# Patient Record
Sex: FEMALE | Race: BLACK OR AFRICAN AMERICAN | Employment: UNEMPLOYED | URBAN - METROPOLITAN AREA
[De-identification: names, ages, dates, MRNs, and addresses within clinical notes are randomized per-mention and may not be internally consistent; named-entity substitution may affect disease eponyms.]

---

## 2017-01-03 ENCOUNTER — OP HISTORICAL/CONVERTED ENCOUNTER (OUTPATIENT)
Dept: OTHER | Age: 47
End: 2017-01-03

## 2017-11-25 ENCOUNTER — ED HISTORICAL/CONVERTED ENCOUNTER (OUTPATIENT)
Dept: OTHER | Age: 47
End: 2017-11-25

## 2018-07-17 ENCOUNTER — WORRISOME GROWTH - SEE NOTE (OUTPATIENT)
Dept: URBAN - METROPOLITAN AREA CLINIC 34 | Facility: CLINIC | Age: 48
Setting detail: DERMATOLOGY
End: 2018-07-17

## 2018-07-17 PROCEDURE — 17110 DESTRUCT B9 LESION 1-14: CPT

## 2018-07-17 PROCEDURE — OTHER COSMETIC TREATMENT: OTHER

## 2019-08-25 ENCOUNTER — ED HISTORICAL/CONVERTED ENCOUNTER (OUTPATIENT)
Dept: OTHER | Age: 49
End: 2019-08-25

## 2020-03-06 ENCOUNTER — OP HISTORICAL/CONVERTED ENCOUNTER (OUTPATIENT)
Dept: OTHER | Age: 50
End: 2020-03-06

## 2020-03-14 PROBLEM — 40739000: Status: ACTIVE | Noted: 2020-02-04

## 2020-03-14 PROBLEM — 235595009: Status: ACTIVE | Noted: 2020-02-04

## 2020-05-11 PROBLEM — 197125005: Status: ACTIVE | Noted: 2020-03-11

## 2020-05-11 PROBLEM — 40890009: Status: ACTIVE | Noted: 2020-05-11

## 2020-05-11 PROBLEM — 266435005: Status: ACTIVE | Noted: 2020-05-11

## 2020-06-09 ENCOUNTER — OFFICE VISIT (OUTPATIENT)
Dept: URBAN - METROPOLITAN AREA CLINIC 35 | Facility: CLINIC | Age: 50
End: 2020-06-09

## 2020-06-09 ENCOUNTER — TELEPHONE ENCOUNTER (OUTPATIENT)
Dept: URBAN - METROPOLITAN AREA CLINIC 35 | Facility: CLINIC | Age: 50
End: 2020-06-09

## 2020-06-09 ENCOUNTER — DASHBOARD ENCOUNTERS (OUTPATIENT)
Age: 50
End: 2020-06-09

## 2020-06-09 VITALS — HEIGHT: 64 IN | WEIGHT: 220 LBS | BODY MASS INDEX: 37.56 KG/M2

## 2020-06-09 RX ORDER — AMITRIPTYLINE HYDROCHLORIDE 25 MG/1
1 TABLET AT BEDTIME TABLET, FILM COATED ORAL ONCE A DAY
Qty: 30 | Refills: 1 | Status: ACTIVE | COMMUNITY
Start: 2020-05-11

## 2020-06-09 RX ORDER — LORATADINE 10 MG/1
1 TABLET TABLET ORAL ONCE A DAY
Qty: 30 | Status: ACTIVE | COMMUNITY

## 2020-06-09 RX ORDER — POLYETHYLENE GLYCOL 3350, SODIUM SULFATE, SODIUM CHLORIDE, POTASSIUM CHLORIDE, ASCORBIC ACID, SODIUM ASCORBATE 140-9-5.2G
140GM KIT ORAL BID
Qty: 1 | Refills: 0 | Status: ON HOLD | COMMUNITY
Start: 2020-02-04

## 2020-06-09 RX ORDER — UBIDECARENONE 30 MG
AS DIRECTED CAPSULE ORAL
Status: ACTIVE | COMMUNITY

## 2020-06-09 RX ORDER — RIFAXIMIN 550 MG/1
1 TABLET TABLET ORAL TID
Qty: 42 TABLET | Refills: 1 | Status: ON HOLD | COMMUNITY
Start: 2020-03-11

## 2020-06-09 RX ORDER — TELMISARTAN 80 MG/1
TAKE 1 TABLET BY MOUTH EVERY DAY TABLET ORAL
Qty: 90 UNSPECIFIED | Refills: 1 | Status: ACTIVE | COMMUNITY

## 2020-06-09 RX ORDER — OMEPRAZOLE 40 MG/1
AS DIRECTED CAPSULE, DELAYED RELEASE PELLETS ORAL
Qty: 90 | Refills: 1 | Status: ON HOLD | COMMUNITY
Start: 2020-02-04

## 2020-06-09 RX ORDER — DICYCLOMINE HYDROCHLORIDE 10 MG/1
1 CAPSULE CAPSULE ORAL THREE TIMES A DAY
Qty: 90 CAPSULE | Refills: 1 | OUTPATIENT
Start: 2020-06-09

## 2020-06-09 RX ORDER — OMEPRAZOLE 40 MG/1
1 CAPSULE 30 MINUTES BEFORE MORNING MEAL CAPSULE, DELAYED RELEASE ORAL ONCE A DAY
Qty: 90 UNSPECIFIED | Refills: 3 | Status: ACTIVE | COMMUNITY

## 2020-06-09 NOTE — HPI-MIGRATED HPI
;   ;   ;     Dysphagia : Patient denies dysphagia at this times. She continues to take Omeprazole 40 mg once a day.   Last visit (5/11/2020) Patient denies dysphagia at this times. She continues to take Omeprazole 40 mg once a day.   Last visit (3/11/2020) Since the procedure patient denies dysphagia, globus, changes in appetite, and changes in bowel habits. She has increased to Omeprazole 40 mg.  EGD with dilatation (51 Fr savary dilator): 2 cm HH, esophageal ring at GEJ, large amount of bile in stomach, small benign looking polyp in antrum of stomach that could not be dilated as procedure had to be performed quickly as she was intermittently coughing during procedure (refer to full EGD report for details).  Last visit 2/4/2020 Patient admits onset of dysphagia a few months ago. She admits discomfort with swallowing liquids, and solids.   She is currently taking Omeprazole 20 mg with some relief. She has been on the this for years.   Symptoms include burning in the esophagus.;   Abdominal Pain : Patient denies adominal pain at this time   Last visit (5/11/2020) Patient denies abdominal pain at this time.  Last visit (3/11/2020) Patient presents today for a follow up of her EGD and colonoscopy. Patient denies any complications after her procedure.    Patient admits 1-3 BM's a day with stools loose. No BM's in middle of night. Patient denies any rectal bleeding, melena, mucus, pruritus ani, or rectal pain at this time.  Still has pain in LLQ although better.  Colonoscopy and path report as documented below.  Celiac panel negative  Stool Pancreatic Elastase NL   Last visit (2/4/2020) 49 year old female presents today for consultation of abdominal pain. Patient admits abdominal pain that is located in LLQ and is described as a dull ache that varies in intensity.   Pain started several years ago after she had Hysterectomy  but a change has occurred within the past few months; pain is occurring daily and happens when she has to have a BM.   Patient admits 2-3 BM' s per day with thin loose stool. Patient denies melena, blood, or mucus in stool.   Baseline: BM's approximately 30 minutes after meals since GB surgery 20 yrs ago. Patient admits the use of antibiotics within the last 2 months for treatment of a cold; she took it for 10 days.  She denies a family history of colon cancer or diseases/esophageal or gastric cancer or diseases. She also denies past Colonoscopy/EGD.  Stool studies 12/27/2019 were all not detected. Studies included E Coli, Campylobacter, Salmonella and shigella, C-Diff, Crypto, Giardia, Ova and Parasites, and fecal leukocyte.  CT ABD/Pelvis 12/27/2019: No free fluid within the abdomen, No intra-abdominal or retroperitoneal adenopathy. Small hiatal hernia. Colonic diverticulosis without evidence of diverticulitis. Normal caliber large and small bowel. No evidence of bowel obstruction. Normal appendix. Scattered colonic diverticulosis without evidence of diverticulitis. No acute findings within the abdomen or pelvis.   No fever. No N/V. She has heartburn and takes Omeprazole for it and works pretty well. Occasionally will take Tums. She describes swallowing issues. States feels whatever she is drinking or eats will get stuck in upper esophagus. Food and liquids will go down; just feel like if gets stuck. ;   IBS : Patient presents today for follow up of IBS via tele health visit and consent has been obtained.   Patient was started on Amitriptyline HCl Tablet, 25. She states only had mild improvement of IBS symptoms. She admits still 3-4 BMs per day, with soft stool. She denies melena, blood, or mucus in stool.   No abdominal crampy pain associated to BM's. There is just minimal bloating. She has not been able to identified any food that will make it worse.  Patient has had a colonoscopy with random colon bx's. EGD with small bowel bx's, all negative for possible etiology of loose frequent stools. Stool studies 12/27/19 were negative for bacteria, giardia, crypto, O+P, C Diff and leukocytes. Pancreatic elastase NL Patient does not think lactose products bother her.  Frequent BM's precedes the use of PPI's.  Last visit (5/11/2020) Patient presents today for follow up of IBS via tele visit and consent has been obtained.  Patient admits 4-5 BMs per day with loose stool. She denies melena, blood, or mucus in stool.   Patient completed Xifaxan 550 mg, she denies improvement.;

## 2020-06-17 ENCOUNTER — ED HISTORICAL/CONVERTED ENCOUNTER (OUTPATIENT)
Dept: OTHER | Age: 50
End: 2020-06-17

## 2020-07-08 ENCOUNTER — ED HISTORICAL/CONVERTED ENCOUNTER (OUTPATIENT)
Dept: OTHER | Age: 50
End: 2020-07-08

## 2020-08-17 ENCOUNTER — LAB OUTSIDE AN ENCOUNTER (OUTPATIENT)
Dept: URBAN - METROPOLITAN AREA CLINIC 35 | Facility: CLINIC | Age: 50
End: 2020-08-17

## 2020-08-20 LAB — OVA AND PARASITE - QDX: (no result)

## 2020-09-04 ENCOUNTER — TELEPHONE ENCOUNTER (OUTPATIENT)
Dept: URBAN - METROPOLITAN AREA CLINIC 35 | Facility: CLINIC | Age: 50
End: 2020-09-04

## 2021-04-12 ENCOUNTER — TRANSCRIBE ORDER (OUTPATIENT)
Dept: SCHEDULING | Age: 51
End: 2021-04-12

## 2021-04-12 DIAGNOSIS — K57.00 PERFORATED DIVERTICULUM OF DUODENUM: Primary | ICD-10-CM

## 2021-11-15 ENCOUNTER — HOSPITAL ENCOUNTER (EMERGENCY)
Age: 51
Discharge: HOME | End: 2021-11-15
Attending: EMERGENCY MEDICINE
Payer: MEDICAID

## 2021-11-15 ENCOUNTER — APPOINTMENT (OUTPATIENT)
Dept: CT IMAGING | Age: 51
End: 2021-11-15
Attending: EMERGENCY MEDICINE
Payer: MEDICAID

## 2021-11-15 VITALS
BODY MASS INDEX: 36.16 KG/M2 | DIASTOLIC BLOOD PRESSURE: 74 MMHG | HEIGHT: 66 IN | TEMPERATURE: 98.2 F | OXYGEN SATURATION: 98 % | RESPIRATION RATE: 18 BRPM | SYSTOLIC BLOOD PRESSURE: 123 MMHG | HEART RATE: 75 BPM | WEIGHT: 225 LBS

## 2021-11-15 DIAGNOSIS — S06.0X0A CONCUSSION WITHOUT LOSS OF CONSCIOUSNESS, INITIAL ENCOUNTER: Primary | ICD-10-CM

## 2021-11-15 PROCEDURE — 70450 CT HEAD/BRAIN W/O DYE: CPT

## 2021-11-15 PROCEDURE — 72125 CT NECK SPINE W/O DYE: CPT

## 2021-11-15 PROCEDURE — 99283 EMERGENCY DEPT VISIT LOW MDM: CPT

## 2021-11-15 NOTE — Clinical Note
[facility] EMERGENCY DEPARTMENT  [address] [id number] [phone]    Work/School Note    Date: 11/15/2021    To Whom It May concern:    Gill Quintero was seen and treated today in the emergency room by the following provider(s):  Attending Provider: Miguel Angel Elaine DO. Gill Quintero is excused from work/school on 11/15/21 and 11/16/21. She is medically clear to return to work/school on 11/17/2021.        Sincerely,          Sun Morocho DO
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
## 2021-11-18 ENCOUNTER — OFFICE VISIT (OUTPATIENT)
Dept: FAMILY MEDICINE CLINIC | Age: 51
End: 2021-11-18
Payer: MEDICAID

## 2021-11-18 VITALS
RESPIRATION RATE: 18 BRPM | TEMPERATURE: 97.7 F | SYSTOLIC BLOOD PRESSURE: 118 MMHG | DIASTOLIC BLOOD PRESSURE: 77 MMHG | WEIGHT: 224 LBS | BODY MASS INDEX: 36 KG/M2 | OXYGEN SATURATION: 99 % | HEART RATE: 73 BPM | HEIGHT: 66 IN
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
## 2021-11-18 DIAGNOSIS — R42 DIZZINESS: Primary | ICD-10-CM

## 2021-11-18 PROCEDURE — 99203 OFFICE O/P NEW LOW 30 MIN: CPT | Performed by: NURSE PRACTITIONER

## 2021-11-18 RX ORDER — POLYETHYLENE GLYCOL 3350, SODIUM CHLORIDE, SODIUM BICARBONATE, POTASSIUM CHLORIDE 420; 11.2; 5.72; 1.48 G/4L; G/4L; G/4L; G/4L
POWDER, FOR SOLUTION ORAL
COMMUNITY
Start: 2021-09-24 | End: 2021-12-09
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
## 2021-11-18 NOTE — PATIENT INSTRUCTIONS
Rest is the best treatment. Get plenty of sleep at night. And try to rest during the day. · Avoid activities that are physically or mentally demanding. These include housework, exercise, and schoolwork. And don't play video games, send text messages, or use the computer. You may need to change your school or work schedule to be able to avoid these activities. · Ask your doctor when it's okay to drive, ride a bike, or operate machinery. · Take an over-the-counter pain medicine, such as acetaminophen (Tylenol), ibuprofen (Advil, Motrin), or naproxen (Aleve). Be safe with medicines. Read and follow all instructions on the label. · Check with your doctor before you use any other medicines for pain. · Do not drink alcohol or use illegal drugs. They can slow recovery. They can also increase your risk of getting a second head injury. Take Tylenol Extra Strength or Ibuprofen for headache. Apply ice pack to the painful area on forehead for 20 min several times a day.

## 2021-11-28 PROBLEM — R42 DIZZINESS: Status: ACTIVE | Noted: 2021-11-28
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
## 2021-11-28 PROBLEM — S06.0X0A CONCUSSION WITHOUT LOSS OF CONSCIOUSNESS: Status: ACTIVE | Noted: 2021-11-28

## 2021-11-28 PROBLEM — K57.00 PERFORATED DIVERTICULUM OF DUODENUM: Status: ACTIVE | Noted: 2021-11-28
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
## 2021-12-09 ENCOUNTER — APPOINTMENT (OUTPATIENT)
Dept: GENERAL RADIOLOGY | Age: 51
End: 2021-12-09
Attending: FAMILY MEDICINE
Payer: MEDICAID

## 2021-12-09 ENCOUNTER — HOSPITAL ENCOUNTER (EMERGENCY)
Age: 51
Discharge: HOME | End: 2021-12-09
Attending: FAMILY MEDICINE
Payer: MEDICAID

## 2021-12-09 VITALS
DIASTOLIC BLOOD PRESSURE: 76 MMHG | SYSTOLIC BLOOD PRESSURE: 124 MMHG | OXYGEN SATURATION: 99 % | HEIGHT: 66 IN | TEMPERATURE: 98.2 F | BODY MASS INDEX: 36.96 KG/M2 | RESPIRATION RATE: 16 BRPM | WEIGHT: 230 LBS | HEART RATE: 76 BPM

## 2021-12-09 DIAGNOSIS — S20.211A CONTUSION OF RIB ON RIGHT SIDE, INITIAL ENCOUNTER: Primary | ICD-10-CM

## 2021-12-09 DIAGNOSIS — S29.011A INTERCOSTAL MUSCLE STRAIN, INITIAL ENCOUNTER: ICD-10-CM

## 2021-12-09 PROCEDURE — 71101 X-RAY EXAM UNILAT RIBS/CHEST: CPT

## 2021-12-09 PROCEDURE — 96372 THER/PROPH/DIAG INJ SC/IM: CPT

## 2021-12-09 PROCEDURE — 74011250636 HC RX REV CODE- 250/636: Performed by: FAMILY MEDICINE

## 2021-12-09 PROCEDURE — 99282 EMERGENCY DEPT VISIT SF MDM: CPT

## 2021-12-09 RX ORDER — KETOROLAC TROMETHAMINE 30 MG/ML
30 INJECTION, SOLUTION INTRAMUSCULAR; INTRAVENOUS
Status: COMPLETED | OUTPATIENT
Start: 2021-12-09 | End: 2021-12-09

## 2021-12-09 RX ORDER — NAPROXEN 500 MG/1
500 TABLET ORAL
Qty: 20 TABLET | Refills: 0 | Status: SHIPPED | OUTPATIENT
Start: 2021-12-09 | End: 2021-12-19

## 2021-12-09 RX ADMIN — KETOROLAC TROMETHAMINE 30 MG: 30 INJECTION, SOLUTION INTRAMUSCULAR; INTRAVENOUS at 12:03

## 2021-12-09 NOTE — DISCHARGE INSTRUCTIONS
Thank you! Thank you for allowing me to care for you in the emergency department. I sincerely hope that you are satisfied with your visit today. It is my goal to provide you with excellent care. Below you will find a list of your labs and imaging from your visit today. Should you have any questions regarding these results please do not hesitate to call the emergency department. Labs -   No results found for this or any previous visit (from the past 12 hour(s)). Radiologic Studies -   XR RIBS RT W PA CXR MIN 3 V   Final Result      Negative. CT Results  (Last 48 hours)      None          CXR Results  (Last 48 hours)      None               If you feel that you have not received excellent quality care or timely care, please ask to speak to the nurse manager. Please choose us in the future for your continued health care needs. ------------------------------------------------------------------------------------------------------------  The exam and treatment you received in the Emergency Department were for an urgent problem and are not intended as complete care. It is important that you follow-up with a doctor, nurse practitioner, or physician assistant to:  (1) confirm your diagnosis,  (2) re-evaluation of changes in your illness and treatment, and  (3) for ongoing care. If your symptoms become worse or you do not improve as expected and you are unable to reach your usual health care provider, you should return to the Emergency Department. We are available 24 hours a day. Please take your discharge instructions with you when you go to your follow-up appointment. If you have any problem arranging a follow-up appointment, contact the Emergency Department immediately. If a prescription has been provided, please have it filled as soon as possible to prevent a delay in treatment. Read the entire medication instruction sheet provided to you by the pharmacy.  If you have any questions or reservations about taking the medication due to side effects or interactions with other medications, please call your primary care physician or contact the ER to speak with the charge nurse. Make an appointment with your family doctor or the physician you were referred to for follow-up of this visit as instructed on your discharge paperwork, as this is a mandatory follow-up. Return to the ER if you are unable to be seen or if you are unable to be seen in a timely manner. If you have any problem arranging the follow-up visit, contact the Emergency Department immediately.

## 2021-12-09 NOTE — LETTER
[facility] EMERGENCY DEPARTMENT  [address] [place] [id number]  [phone]    Work/School Note    Date: 12/9/2021    To Whom It May concern:    Sydney Ceron was seen and treated today in the emergency room by the following provider(s):  Attending Provider: Hesham Orr DO.       Sydney Ceron may return to work 12/12/2021     Sincerely,      Dr. Eda Weber

## 2021-12-09 NOTE — ED PROVIDER NOTES
EMERGENCY DEPARTMENT HISTORY AND PHYSICAL EXAM      Date: 12/9/2021  Patient Name: Trang Frye    History of Presenting Illness     Chief Complaint   Patient presents with    Back Pain       History Provided By:     HPI: Trang Frye, is an extremely pleasant 46 y.o. female presenting to the ED with a chief complaint of back and rib pain. She states she recently slipped on a trash can lid on Wednesday. She fell onto her back. Since that time she has had pain on the right side of her mid back as well as her right ribs. No chest pain or shortness of breath. No numbness, tingling or weakness in extremities. No saddle anesthesia. No difficulty urinating or stooling. No midline back pain. There are no other complaints, changes, or physical findings at this time. PCP: Natan Montoya, DO    No current facility-administered medications on file prior to encounter. Current Outpatient Medications on File Prior to Encounter   Medication Sig Dispense Refill    [DISCONTINUED] peg-electrolyte soln (NULYTELY) 420 gram solution MIX AND DRINK AS DIRECTED         Past History     Past Medical History:  Past Medical History:   Diagnosis Date    Ectopic fetus        Past Surgical History:  Past Surgical History:   Procedure Laterality Date    HX TUBAL LIGATION         Family History:  Family History   Problem Relation Age of Onset    Cancer Mother        Social History:  Social History     Tobacco Use    Smoking status: Never Smoker    Smokeless tobacco: Never Used   Vaping Use    Vaping Use: Never used   Substance Use Topics    Alcohol use: Yes     Comment: occasionally    Drug use: Never       Allergies:  No Known Allergies      Review of Systems     Review of Systems   Constitutional: Negative for activity change, appetite change, chills, fatigue and fever. HENT: Negative for congestion and sore throat. Eyes: Negative for photophobia and visual disturbance.    Respiratory: Negative for cough, shortness of breath and wheezing. Cardiovascular: Negative for chest pain, palpitations and leg swelling. Gastrointestinal: Negative for abdominal pain, diarrhea, nausea and vomiting. Endocrine: Negative for cold intolerance and heat intolerance. Musculoskeletal: Negative for gait problem and joint swelling. See HPI   Skin: Negative for color change and rash. Neurological: Negative for dizziness and headaches. Physical Exam     Physical Exam  Constitutional:       Appearance: She is well-developed. HENT:      Head: Normocephalic and atraumatic. Mouth/Throat:      Mouth: Mucous membranes are moist.      Pharynx: Oropharynx is clear. Eyes:      Conjunctiva/sclera: Conjunctivae normal.      Pupils: Pupils are equal, round, and reactive to light. Cardiovascular:      Rate and Rhythm: Normal rate and regular rhythm. Heart sounds: No murmur heard. Pulmonary:      Effort: No respiratory distress. Breath sounds: No stridor. No wheezing, rhonchi or rales. Abdominal:      General: There is no distension. Tenderness: There is no abdominal tenderness. There is no rebound. Musculoskeletal:      Cervical back: Normal range of motion and neck supple. Comments: Tenderness and likely spasm of the right thoracic paraspinal muscles with palpation. Tenderness along the midaxillary line of ribs T6-T9 on the right but no midline neck nor back pain with palpation. Skin:     General: Skin is warm and dry. Neurological:      General: No focal deficit present. Mental Status: She is alert and oriented to person, place, and time. Psychiatric:         Mood and Affect: Mood normal.         Behavior: Behavior normal.         Lab and Diagnostic Study Results     Labs -   No results found for this or any previous visit (from the past 12 hour(s)).     Radiologic Studies -   @lastxrresult@  CT Results  (Last 48 hours)    None        CXR Results  (Last 48 hours)    None            Medical Decision Making   - I am the first provider for this patient. - I reviewed the vital signs, available nursing notes, past medical history, past surgical history, family history and social history. - Initial assessment performed. The patients presenting problems have been discussed, and they are in agreement with the care plan formulated and outlined with them. I have encouraged them to ask questions as they arise throughout their visit. Vital Signs-Reviewed the patient's vital signs. Patient Vitals for the past 12 hrs:   Temp Pulse Resp BP SpO2   12/09/21 1118 98.2 °F (36.8 °C) 76 16 124/76 99 %         ED Course/ Provider Notes (Medical Decision Making):     Patient presented to the emergency department with a chief complaint of back and rib pain. On examination the patient is nontoxic and well-appearing. Vitals were reviewed per above. No midline neck nor back pain. No findings of cauda equina syndrome. Chest x-ray and rib series demonstrates no acute abnormality. Starting to feel slightly better after Toradol. Discussed supportive measures for her likely thoracic paraspinal muscle spasm and rib injury    Angelito Lee was given a thorough list of signs and symptoms that would warrant an immediate return to the emergency department. Otherwise Angelito Lee will follow up with PCP. Procedures   Medical Decision Makingedical Decision Making  Performed by: Tanisha Nassar DO  Procedures  None       Disposition   Disposition:     Home     All of the diagnostic tests were reviewed and questions answered. Diagnosis, care plan and treatment options were discussed. The patient understands the instructions and will follow up as directed. The patients results have been reviewed with them. They have been counseled regarding their diagnosis.   The patient verbally convey understanding and agreement of the signs, symptoms, diagnosis, treatment and prognosis and additionally agrees to follow up as recommended with their PCP in 24 - 48 hours. They also agree with the care-plan and convey that all of their questions have been answered. I have also put together some discharge instructions for them that include: 1) educational information regarding their diagnosis, 2) how to care for their diagnosis at home, as well a 3) list of reasons why they would want to return to the ED prior to their follow-up appointment, should their condition change. DISCHARGE PLAN:    1. Current Discharge Medication List      START taking these medications    Details   naproxen (NAPROSYN) 500 mg tablet Take 1 Tablet by mouth two (2) times daily as needed for Pain for up to 10 days. Qty: 20 Tablet, Refills: 0               2.   Follow-up Information     Follow up With Specialties Details Why Contact Info    Anju Bunn DO Family Medicine Call   [facility] [address] [facility]  [phone]              3.  Return to ED if worse       4. Current Discharge Medication List      START taking these medications    Details   naproxen (NAPROSYN) 500 mg tablet Take 1 Tablet by mouth two (2) times daily as needed for Pain for up to 10 days. Qty: 20 Tablet, Refills: 0  Start date: 12/9/2021, End date: 12/19/2021               Diagnosis     Clinical Impression:   1. Contusion of rib on right side, initial encounter    2. Intercostal muscle strain, initial encounter        Attestations:    Tanisha Nassar, DO    Please note that this dictation was completed with [email or web address], the computer voice recognition software. Quite often unanticipated grammatical, syntax, homophones, and other interpretive errors are inadvertently transcribed by the computer software. Please disregard these errors. Please excuse any errors that have escaped final proofreading. Thank you.

## 2022-02-20 ENCOUNTER — HOSPITAL ENCOUNTER (EMERGENCY)
Age: 52
Discharge: LEFT BEFORE DISCHARGE | End: 2022-02-20
Payer: MEDICAID

## 2022-02-20 ENCOUNTER — APPOINTMENT (OUTPATIENT)
Dept: GENERAL RADIOLOGY | Age: 52
End: 2022-02-20
Attending: PHYSICIAN ASSISTANT
Payer: MEDICAID

## 2022-02-20 VITALS
TEMPERATURE: 98.2 F | HEART RATE: 77 BPM | WEIGHT: 227 LBS | DIASTOLIC BLOOD PRESSURE: 80 MMHG | HEIGHT: 66 IN | BODY MASS INDEX: 36.48 KG/M2 | SYSTOLIC BLOOD PRESSURE: 127 MMHG | RESPIRATION RATE: 18 BRPM | OXYGEN SATURATION: 99 %

## 2022-02-20 DIAGNOSIS — M54.2 NECK PAIN: ICD-10-CM

## 2022-02-20 DIAGNOSIS — M54.6 THORACIC BACK PAIN, UNSPECIFIED BACK PAIN LATERALITY, UNSPECIFIED CHRONICITY: ICD-10-CM
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
## 2022-02-20 PROCEDURE — 75810000275 HC EMERGENCY DEPT VISIT NO LEVEL OF CARE

## 2022-02-20 PROCEDURE — 72050 X-RAY EXAM NECK SPINE 4/5VWS: CPT

## 2022-02-20 PROCEDURE — 72110 X-RAY EXAM L-2 SPINE 4/>VWS: CPT

## 2022-02-20 RX ORDER — NAPROXEN 500 MG/1
500 TABLET ORAL 2 TIMES DAILY WITH MEALS
Qty: 20 TABLET | Refills: 0 | Status: SHIPPED | OUTPATIENT
Start: 2022-02-20 | End: 2022-03-02
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
## 2022-02-20 NOTE — Clinical Note
[facility] EMERGENCY DEPT  [facility] [place] [id number] [phone]    Work/School Note    Date: 2/20/2022    To Whom It May concern:    Yasmany Greenwood was seen and treated today in the emergency room by the following provider(s):  Nurse Practitioner: Charlene Villanueva NP  Physician Assistant: Charlene Villanueva NP. Yasmany Greenwood is excused from work/school on 2/20/2022 through 2/22/2022. She is medically clear to return to work/school on 2/23/2022.          Sincerely,          Tu Vieira NP

## 2022-03-18 PROBLEM — K57.00 PERFORATED DIVERTICULUM OF DUODENUM: Status: ACTIVE | Noted: 2021-11-28

## 2022-03-19 PROBLEM — S06.0X0A CONCUSSION WITHOUT LOSS OF CONSCIOUSNESS: Status: ACTIVE | Noted: 2021-11-28
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
## 2022-03-20 PROBLEM — R42 DIZZINESS: Status: ACTIVE | Noted: 2021-11-28

## 2022-08-10 ENCOUNTER — HOSPITAL ENCOUNTER (EMERGENCY)
Age: 52
Discharge: HOME | End: 2022-08-10
Payer: MEDICAID

## 2022-08-10 ENCOUNTER — APPOINTMENT (OUTPATIENT)
Dept: GENERAL RADIOLOGY | Age: 52
End: 2022-08-10
Attending: NURSE PRACTITIONER
Payer: MEDICAID

## 2022-08-10 VITALS
SYSTOLIC BLOOD PRESSURE: 135 MMHG | RESPIRATION RATE: 18 BRPM | TEMPERATURE: 98.3 F | DIASTOLIC BLOOD PRESSURE: 90 MMHG | WEIGHT: 227 LBS | HEART RATE: 74 BPM | OXYGEN SATURATION: 99 % | HEIGHT: 69 IN | BODY MASS INDEX: 33.62 KG/M2

## 2022-08-10 DIAGNOSIS — M25.511 ACUTE PAIN OF RIGHT SHOULDER: Primary | ICD-10-CM

## 2022-08-10 LAB
ATRIAL RATE: 69 BPM
CALCULATED P AXIS, ECG09: 50 DEGREES
CALCULATED R AXIS, ECG10: 0 DEGREES
CALCULATED T AXIS, ECG11: 0 DEGREES
DIAGNOSIS, 93000: NORMAL
P-R INTERVAL, ECG05: 158 MS
Q-T INTERVAL, ECG07: 392 MS
QRS DURATION, ECG06: 80 MS
QTC CALCULATION (BEZET), ECG08: 420 MS
VENTRICULAR RATE, ECG03: 69 BPM

## 2022-08-10 PROCEDURE — 93005 ELECTROCARDIOGRAM TRACING: CPT

## 2022-08-10 PROCEDURE — 99284 EMERGENCY DEPT VISIT MOD MDM: CPT

## 2022-08-10 PROCEDURE — 73030 X-RAY EXAM OF SHOULDER: CPT

## 2022-08-10 PROCEDURE — 74011250637 HC RX REV CODE- 250/637: Performed by: NURSE PRACTITIONER

## 2022-08-10 RX ORDER — METHOCARBAMOL 500 MG/1
500 TABLET, FILM COATED ORAL
Qty: 10 TABLET | Refills: 0 | Status: SHIPPED | OUTPATIENT
Start: 2022-08-10

## 2022-08-10 RX ORDER — IBUPROFEN 800 MG/1
800 TABLET ORAL
Status: COMPLETED | OUTPATIENT
Start: 2022-08-10 | End: 2022-08-10

## 2022-08-10 RX ORDER — NAPROXEN 500 MG/1
500 TABLET ORAL 2 TIMES DAILY WITH MEALS
Qty: 20 TABLET | Refills: 0 | Status: SHIPPED | OUTPATIENT
Start: 2022-08-10 | End: 2022-08-20

## 2022-08-10 RX ADMIN — IBUPROFEN 800 MG: 800 TABLET, FILM COATED ORAL at 13:32

## 2022-08-10 NOTE — ED TRIAGE NOTES
Pt comes in with c/o R shoulder pain that she woke with yesterday morning. She reports that she has pain in her R lateral neck that goes to her shoulder and her shoulder feels heavy.

## 2022-08-10 NOTE — Clinical Note
[facility] EMERGENCY DEPARTMENT  [address] [facility] [address]  [phone]    Work/School Note    Date: 8/10/2022    To Whom It May concern:      Trang Frye was seen and treated today in the emergency room by the following provider(s):  Nurse Practitioner: Robin Carlson NP. Trang Frye is excused from work/school on 08/10/22. She is clear to return to work/school on 08/11/22.         Sincerely,          Awilda Carlson NP

## 2022-08-10 NOTE — DISCHARGE INSTRUCTIONS
Thank you! Thank you for allowing me to care for you in the emergency department. It is my goal to provide you with excellent care. If you have not received excellent quality care, please ask to speak to the nurse manager. Please fill out the survey that will come to you by mail or email since we listen to your feedback! Below you will find a list of your tests from today's visit. Should you have any questions, please do not hesitate to call the emergency department. Labs  No results found for this or any previous visit (from the past 12 hour(s)). Radiologic Studies  XR SHOULDER RT AP/LAT MIN 2 V   Final Result   No evidence of fracture or dislocation. CT Results  (Last 48 hours)      None          CXR Results  (Last 48 hours)      None          ------------------------------------------------------------------------------------------------------------  The exam and treatment you received in the Emergency Department were for an urgent problem and are not intended as complete care. It is important that you follow-up with a doctor, nurse practitioner, or physician assistant to:  (1) confirm your diagnosis,  (2) re-evaluation of changes in your illness and treatment, and  (3) for ongoing care. Please take your discharge instructions with you when you go to your follow-up appointment. If you have any problem arranging a follow-up appointment, contact the Emergency Department. If your symptoms become worse or you do not improve as expected and you are unable to reach your health care provider, please return to the Emergency Department. We are available 24 hours a day. If a prescription has been provided, please have it filled as soon as possible to prevent a delay in treatment. If you have any questions or reservations about taking the medication due to side effects or interactions with other medications, please call your primary care provider or contact the ER.

## 2022-08-10 NOTE — ED PROVIDER NOTES
EMERGENCY DEPARTMENT HISTORY AND PHYSICAL EXAM      Date: 8/10/2022  Patient Name: Leonides Bautista    History of Presenting Illness     Chief Complaint   Patient presents with    Shoulder Pain       History Provided By: Patient    HPI: Leonides Bautista, 46 y.o. female with a significant past medical history of  presents to the ED with shoulder pain since yesterday. She complains of decreased range of motion, heaviness, pain radiating down her arm. She reports use of BC with no improvement in symptoms. Denies any numbness, tingling, chest pain, shortness of breath, fever, chills, injury, trauma, erythema, edema, warmth, drainage, weakness. There are no other complaints, changes, or physical findings at this time. PCP: Daphne Haney, DO    No current facility-administered medications on file prior to encounter. No current outpatient medications on file prior to encounter. Past History     Past Medical History:  Past Medical History:   Diagnosis Date    Ectopic fetus        Past Surgical History:  Past Surgical History:   Procedure Laterality Date    HX TUBAL LIGATION         Family History:  Family History   Problem Relation Age of Onset    Cancer Mother        Social History:  Social History     Tobacco Use    Smoking status: Never    Smokeless tobacco: Never   Vaping Use    Vaping Use: Never used   Substance Use Topics    Alcohol use: Yes     Comment: occasionally    Drug use: Never       Allergies:  No Known Allergies    Review of Systems   Review of Systems   Constitutional:  Negative for chills and fever. Respiratory:  Negative for shortness of breath. Cardiovascular:  Negative for chest pain. Musculoskeletal:  Positive for arthralgias and myalgias. Right shoulder   Skin: Negative. Neurological:  Negative for dizziness, weakness, light-headedness, numbness and headaches. Psychiatric/Behavioral: Negative. All other systems reviewed and are negative.     Physical Exam   Physical Exam  Vitals and nursing note reviewed. Constitutional:       General: She is not in acute distress. Appearance: Normal appearance. She is normal weight. She is not ill-appearing or toxic-appearing. HENT:      Head: Normocephalic and atraumatic. Right Ear: Hearing normal.      Left Ear: Hearing normal.      Nose: Nose normal.      Mouth/Throat:      Mouth: Mucous membranes are moist.   Eyes:      General: Lids are normal.      Extraocular Movements: Extraocular movements intact. Pupils: Pupils are equal, round, and reactive to light. Cardiovascular:      Rate and Rhythm: Normal rate and regular rhythm. Pulses: Normal pulses. Radial pulses are 2+ on the right side and 2+ on the left side. Dorsalis pedis pulses are 2+ on the right side and 2+ on the left side. Pulmonary:      Effort: Pulmonary effort is normal. No accessory muscle usage or respiratory distress. Breath sounds: Normal breath sounds. No wheezing or rhonchi. Abdominal:      General: Bowel sounds are normal.      Palpations: Abdomen is soft. Tenderness: There is no abdominal tenderness. There is no right CVA tenderness or left CVA tenderness. Musculoskeletal:      Right shoulder: Tenderness present. No swelling or effusion. Decreased range of motion. Normal strength. Normal pulse. Cervical back: Normal range of motion and neck supple. No muscular tenderness. Right lower leg: No edema. Left lower leg: No edema. Comments: 2+ distal pulses. Less than 2-second capillary refill, positive sensation bilaterally. Decreased ROM due to pain with extension of arm above head. Equal strength bilaterally. Feet:      Right foot:      Skin integrity: No skin breakdown. Left foot:      Skin integrity: No skin breakdown. Skin:     General: Skin is warm and dry. Capillary Refill: Capillary refill takes less than 2 seconds.       Findings: No abrasion, bruising, ecchymosis, erythema or signs of injury. Neurological:      Mental Status: She is alert and oriented to person, place, and time. GCS: GCS eye subscore is 4. GCS verbal subscore is 5. GCS motor subscore is 6. Cranial Nerves: Cranial nerves are intact. Sensory: Sensation is intact. Psychiatric:         Attention and Perception: Attention normal.         Mood and Affect: Mood normal.         Behavior: Behavior normal. Behavior is cooperative. Cognition and Memory: Cognition normal.       Lab and Diagnostic Study Results   Labs -   No results found for this or any previous visit (from the past 12 hour(s)). Radiologic Studies -   @lastxrresult@  CT Results  (Last 48 hours)      None          CXR Results  (Last 48 hours)      None            Medical Decision Making and ED Course   - I am the first provider for this patient. I reviewed the vital signs, available nursing notes, past medical history, past surgical history, family history and social history. - Initial assessment performed. The patients presenting problems have been discussed, and they are in agreement with the care plan formulated and outlined with them. I have encouraged them to ask questions as they arise throughout their visit. Vital Signs-Reviewed the patient's vital signs. Patient Vitals for the past 12 hrs:   Temp Pulse Resp BP SpO2   08/10/22 1304 98.3 °F (36.8 °C) 74 18 (!) 135/90 99 %       Differential Diagnosis & Medical Decision Making Provider Note: Bursitis, shoulder strain, sprain, dislocation, DJD, OA      EKG NSR at 69  ED Course:        Please ROM due to pain x-ray grossly negative. EKG stable. Patient advised supportive care rice therapy use of NSAIDs muscle x-rays. Follow-up with orthopedic if no improvement in symptoms. Verbalized understanding stable at time of discharge. Procedures   Performed by: Trina Arteaga NP  Procedures      Disposition   Disposition: DC- Adult Discharges:  All of the diagnostic tests were reviewed and questions answered. Diagnosis, care plan and treatment options were discussed. The patient understands the instructions and will follow up as directed. The patients results have been reviewed with them. They have been counseled regarding their diagnosis. The patient verbally convey understanding and agreement of the signs, symptoms, diagnosis, treatment and prognosis and additionally agrees to follow up as recommended with their PCP in 24 - 48 hours. They also agree with the care-plan and convey that all of their questions have been answered. I have also put together some discharge instructions for them that include: 1) educational information regarding their diagnosis, 2) how to care for their diagnosis at home, as well a 3) list of reasons why they would want to return to the ED prior to their follow-up appointment, should their condition change. Discharged    DISCHARGE PLAN:  1. There are no discharge medications for this patient. 2.   Follow-up Information       Follow up With Specialties Details Why Contact Info    Sara Denver, DO Family Medicine Schedule an appointment as soon as possible for a visit in 1 week As needed, If symptoms worsen [facility] [phone] [facility] [address]      Paris Ross MD Orthopedic Surgery Schedule an appointment as soon as possible for a visit in 1 week As needed, If symptoms worsen [facility] [address]  [phone]            3. Return to ED if worse   4. Current Discharge Medication List        START taking these medications    Details   naproxen (Naprosyn) 500 mg tablet Take 1 Tablet by mouth two (2) times daily (with meals) for 10 days.   Qty: 20 Tablet, Refills: 0  Start date: 8/10/2022, End date: 8/20/2022      methocarbamoL (ROBAXIN) 500 mg tablet Take 1 Tablet by mouth three (3) times daily as needed for Muscle Spasm(s) or Pain (May cause drowsiness or dizziness). Qty: 10 Tablet, Refills: 0  Start date: 8/10/2022             Diagnosis/Clinical Impression     Clinical Impression:   1. Acute pain of right shoulder        Attestations: Branden COFFMAN NP, am the primary clinician of record. Please note that this dictation was completed with [employer], the computer voice recognition software. Quite often unanticipated grammatical, syntax, homophones, and other interpretive errors are inadvertently transcribed by the computer software. Please disregard these errors. Please excuse any errors that have escaped final proofreading. Thank you.

## 2023-05-17 RX ORDER — METHOCARBAMOL 500 MG/1
500 TABLET, FILM COATED ORAL 3 TIMES DAILY PRN
COMMUNITY
Start: 2022-08-10